# Patient Record
Sex: FEMALE | Race: WHITE | NOT HISPANIC OR LATINO | Employment: FULL TIME | URBAN - METROPOLITAN AREA
[De-identification: names, ages, dates, MRNs, and addresses within clinical notes are randomized per-mention and may not be internally consistent; named-entity substitution may affect disease eponyms.]

---

## 2019-10-17 ENCOUNTER — OFFICE VISIT (OUTPATIENT)
Dept: OPHTHALMOLOGY | Facility: CLINIC | Age: 24
End: 2019-10-17
Payer: COMMERCIAL

## 2019-10-17 DIAGNOSIS — H52.203 MYOPIA OF BOTH EYES WITH ASTIGMATISM: ICD-10-CM

## 2019-10-17 DIAGNOSIS — Z13.5 GLAUCOMA SCREENING: ICD-10-CM

## 2019-10-17 DIAGNOSIS — Z01.00 ENCOUNTER FOR EXAMINATION OF EYES AND VISION WITHOUT ABNORMAL FINDINGS: Primary | ICD-10-CM

## 2019-10-17 DIAGNOSIS — H52.13 MYOPIA OF BOTH EYES WITH ASTIGMATISM: ICD-10-CM

## 2019-10-17 PROCEDURE — 92015 DETERMINE REFRACTIVE STATE: CPT | Mod: S$GLB,,, | Performed by: OPTOMETRIST

## 2019-10-17 PROCEDURE — 92015 PR REFRACTION: ICD-10-PCS | Mod: S$GLB,,, | Performed by: OPTOMETRIST

## 2019-10-17 PROCEDURE — 92004 COMPRE OPH EXAM NEW PT 1/>: CPT | Mod: S$GLB,,, | Performed by: OPTOMETRIST

## 2019-10-17 PROCEDURE — 92004 PR EYE EXAM, NEW PATIENT,COMPREHESV: ICD-10-PCS | Mod: S$GLB,,, | Performed by: OPTOMETRIST

## 2019-10-17 PROCEDURE — 99999 PR PBB SHADOW E&M-NEW PATIENT-LVL I: ICD-10-PCS | Mod: PBBFAC,,, | Performed by: OPTOMETRIST

## 2019-10-17 PROCEDURE — 99999 PR PBB SHADOW E&M-NEW PATIENT-LVL I: CPT | Mod: PBBFAC,,, | Performed by: OPTOMETRIST

## 2019-10-17 RX ORDER — LISDEXAMFETAMINE DIMESYLATE 50 MG/1
CAPSULE ORAL
COMMUNITY
Start: 2018-08-31 | End: 2023-01-11

## 2019-10-17 RX ORDER — NORGESTIMATE AND ETHINYL ESTRADIOL 7DAYSX3 28
KIT ORAL
COMMUNITY
Start: 2018-09-12 | End: 2023-01-11

## 2019-10-17 NOTE — PROGRESS NOTES
HPI     Eye Exam      Additional comments: Yearly              Comments     NP to [facility]  Patient here today yearly eye exam  Last eye exam 1 year ago  No noticeable changes in vision since last eye exam  Patient states she used to wear CTL wants to try again.  * Patient was fit   in daily toric soft lenses.  Insurance changed before she was able to   order her year's supply.  Would like to be fit with them again.  Wears SVL glasses full-time left at home  No other complaints  No drops          Last edited by Safai Pierre, OD on 10/17/2019 11:53 AM. (History)            Assessment /Plan     For exam results, see Encounter Report.    Encounter for examination of eyes and vision without abnormal findings    Glaucoma screening    Myopia of both eyes with astigmatism      Glaucoma screening and fundus exam normal.  Updated glasses prescription.  Return to clinic for toric daily disposable fitting.

## 2019-10-22 ENCOUNTER — OFFICE VISIT (OUTPATIENT)
Dept: OPHTHALMOLOGY | Facility: CLINIC | Age: 24
End: 2019-10-22
Payer: COMMERCIAL

## 2019-10-22 DIAGNOSIS — Z46.0 CONTACT LENS/GLASSES FITTING: Primary | ICD-10-CM

## 2019-10-22 PROCEDURE — 99499 UNLISTED E&M SERVICE: CPT | Mod: S$GLB,,, | Performed by: OPTOMETRIST

## 2019-10-22 PROCEDURE — 99499 NO LOS: ICD-10-PCS | Mod: S$GLB,,, | Performed by: OPTOMETRIST

## 2019-10-22 NOTE — PROGRESS NOTES
HPI     Last seen by [facility] [place] 10[place]17/19 for yearly eye exam  Patient here today for CTL fit  Vision stable    Last edited by Inessa Cota, PCT on 10/22/2019  4:11 PM.   (History)            Assessment /Plan     For exam results, see Encounter Report.    Contact lens/glasses fitting      Order trial lenses:    O.D.  AV Oasys 1-Day  -1.00 - 0.75 X 110  O.S.  AV Oasys 1-Day  [place] - 1.75 X 090    No need to check at dispense.  F/U 3-4 days after dispense.

## 2020-10-27 ENCOUNTER — OFFICE VISIT (OUTPATIENT)
Dept: OPHTHALMOLOGY | Facility: CLINIC | Age: 25
End: 2020-10-27
Payer: COMMERCIAL

## 2020-10-27 DIAGNOSIS — H53.8 BLURRED VISION, BILATERAL: Primary | ICD-10-CM

## 2020-10-27 DIAGNOSIS — H52.13 MYOPIA OF BOTH EYES WITH ASTIGMATISM: ICD-10-CM

## 2020-10-27 DIAGNOSIS — H52.203 MYOPIA OF BOTH EYES WITH ASTIGMATISM: ICD-10-CM

## 2020-10-27 PROCEDURE — 92310 PR CONTACT LENS FITTING (NO CHANGE): ICD-10-PCS | Mod: CSM,S$GLB,, | Performed by: OPTOMETRIST

## 2020-10-27 PROCEDURE — 92014 PR EYE EXAM, EST PATIENT,COMPREHESV: ICD-10-PCS | Mod: S$GLB,,, | Performed by: OPTOMETRIST

## 2020-10-27 PROCEDURE — 99999 PR PBB SHADOW E&M-EST. PATIENT-LVL II: ICD-10-PCS | Mod: PBBFAC,,, | Performed by: OPTOMETRIST

## 2020-10-27 PROCEDURE — 92015 DETERMINE REFRACTIVE STATE: CPT | Mod: S$GLB,,, | Performed by: OPTOMETRIST

## 2020-10-27 PROCEDURE — 92310 CONTACT LENS FITTING OU: CPT | Mod: CSM,S$GLB,, | Performed by: OPTOMETRIST

## 2020-10-27 PROCEDURE — 99999 PR PBB SHADOW E&M-EST. PATIENT-LVL II: CPT | Mod: PBBFAC,,, | Performed by: OPTOMETRIST

## 2020-10-27 PROCEDURE — 92014 COMPRE OPH EXAM EST PT 1/>: CPT | Mod: S$GLB,,, | Performed by: OPTOMETRIST

## 2020-10-27 PROCEDURE — 92015 PR REFRACTION: ICD-10-PCS | Mod: S$GLB,,, | Performed by: OPTOMETRIST

## 2020-10-27 NOTE — PROGRESS NOTES
HPI     Eye Exam     Comments: Yearly              Comments     NP to [facility]  Last seen by [facility] [place] on 10/22/19 for yearly eye exam  Patent here today for yearly eye exam  HPI    Any vision changes since last exam: Yes at distance   Eye pain: No  Other ocular symptoms: No    Do you wear currently wear glasses or contacts? CTL (not wearing today)    Interested in contacts today? Yes    Do you plan on getting new glasses today? If needed                Last edited by Inessa Cota, PCT on 10/27/2020  1:19 PM.   (History)            Assessment /Plan     For exam results, see Encounter Report.    Blurred vision, bilateral    Myopia of both eyes with astigmatism      Eyeglass Final Rx     Eyeglass Final Rx       Sphere Cylinder Axis    Right -1.50 +1.25 010    Left -1.75 +2.00 165    Expiration Date: 10/28/2021                    Good fit and vision with AV 1day moist  Will order same prieto in AV Oasys 1day      Order trials  Ok to dispense without appt    RTC PRN with any problems with fit or comfort after wearing trials  Ok to order supply if no problems    RTC 1 yr for dilated eye exam or PRN if any problems.   Discussed above and answered questions.